# Patient Record
Sex: FEMALE
[De-identification: names, ages, dates, MRNs, and addresses within clinical notes are randomized per-mention and may not be internally consistent; named-entity substitution may affect disease eponyms.]

---

## 2017-11-15 PROBLEM — Z00.00 ENCOUNTER FOR PREVENTIVE HEALTH EXAMINATION: Status: ACTIVE | Noted: 2017-11-15

## 2017-11-21 ENCOUNTER — APPOINTMENT (OUTPATIENT)
Dept: PULMONOLOGY | Facility: CLINIC | Age: 32
End: 2017-11-21
Payer: COMMERCIAL

## 2017-11-21 PROCEDURE — 99203 OFFICE O/P NEW LOW 30 MIN: CPT | Mod: 25

## 2017-11-21 PROCEDURE — 36415 COLL VENOUS BLD VENIPUNCTURE: CPT

## 2017-11-21 PROCEDURE — 93000 ELECTROCARDIOGRAM COMPLETE: CPT

## 2017-11-30 NOTE — ASU PATIENT PROFILE, ADULT - PMH
Asthma  mild persistent  CRI (chronic renal insufficiency), stage 2 (mild) Childhood asthma    Endometriosis    Iron deficiency anemia    Vitamin D deficiency

## 2017-11-30 NOTE — ASU PATIENT PROFILE, ADULT - VISION (WITH CORRECTIVE LENSES IF THE PATIENT USUALLY WEARS THEM):
wear contact lenses/Partially impaired: cannot see medication labels or newsprint, but can see obstacles in path, and the surrounding layout; can count fingers at arm's length

## 2017-12-01 ENCOUNTER — RESULT REVIEW (OUTPATIENT)
Age: 32
End: 2017-12-01

## 2017-12-01 ENCOUNTER — INPATIENT (INPATIENT)
Facility: HOSPITAL | Age: 32
LOS: 0 days | Discharge: HOME | DRG: 743 | End: 2017-12-02
Attending: OBSTETRICS & GYNECOLOGY | Admitting: OBSTETRICS & GYNECOLOGY
Payer: COMMERCIAL

## 2017-12-01 VITALS
HEIGHT: 62 IN | WEIGHT: 181 LBS | RESPIRATION RATE: 18 BRPM | OXYGEN SATURATION: 99 % | HEART RATE: 67 BPM | TEMPERATURE: 98 F | SYSTOLIC BLOOD PRESSURE: 135 MMHG | DIASTOLIC BLOOD PRESSURE: 64 MMHG

## 2017-12-01 DIAGNOSIS — Z98.890 OTHER SPECIFIED POSTPROCEDURAL STATES: Chronic | ICD-10-CM

## 2017-12-01 RX ORDER — OXYCODONE AND ACETAMINOPHEN 5; 325 MG/1; MG/1
1 TABLET ORAL EVERY 4 HOURS
Qty: 0 | Refills: 0 | Status: DISCONTINUED | OUTPATIENT
Start: 2017-12-01 | End: 2017-12-02

## 2017-12-01 RX ORDER — KETOROLAC TROMETHAMINE 30 MG/ML
30 SYRINGE (ML) INJECTION EVERY 6 HOURS
Qty: 0 | Refills: 0 | Status: DISCONTINUED | OUTPATIENT
Start: 2017-12-01 | End: 2017-12-02

## 2017-12-01 RX ORDER — SODIUM CHLORIDE 9 MG/ML
1000 INJECTION, SOLUTION INTRAVENOUS
Qty: 0 | Refills: 0 | Status: DISCONTINUED | OUTPATIENT
Start: 2017-12-01 | End: 2017-12-02

## 2017-12-01 RX ORDER — ONDANSETRON 8 MG/1
4 TABLET, FILM COATED ORAL ONCE
Qty: 0 | Refills: 0 | Status: COMPLETED | OUTPATIENT
Start: 2017-12-01 | End: 2017-12-01

## 2017-12-01 RX ORDER — ACETAMINOPHEN 500 MG
1000 TABLET ORAL ONCE
Qty: 0 | Refills: 0 | Status: COMPLETED | OUTPATIENT
Start: 2017-12-01 | End: 2017-12-01

## 2017-12-01 RX ORDER — SIMETHICONE 80 MG/1
80 TABLET, CHEWABLE ORAL EVERY 8 HOURS
Qty: 0 | Refills: 0 | Status: DISCONTINUED | OUTPATIENT
Start: 2017-12-01 | End: 2017-12-02

## 2017-12-01 RX ORDER — OXYCODONE AND ACETAMINOPHEN 5; 325 MG/1; MG/1
2 TABLET ORAL EVERY 6 HOURS
Qty: 0 | Refills: 0 | Status: DISCONTINUED | OUTPATIENT
Start: 2017-12-01 | End: 2017-12-02

## 2017-12-01 RX ORDER — MORPHINE SULFATE 50 MG/1
4 CAPSULE, EXTENDED RELEASE ORAL
Qty: 0 | Refills: 0 | Status: DISCONTINUED | OUTPATIENT
Start: 2017-12-01 | End: 2017-12-01

## 2017-12-01 RX ORDER — METOCLOPRAMIDE HCL 10 MG
10 TABLET ORAL EVERY 6 HOURS
Qty: 0 | Refills: 0 | Status: DISCONTINUED | OUTPATIENT
Start: 2017-12-01 | End: 2017-12-02

## 2017-12-01 RX ADMIN — MORPHINE SULFATE 4 MILLIGRAM(S): 50 CAPSULE, EXTENDED RELEASE ORAL at 11:28

## 2017-12-01 RX ADMIN — SIMETHICONE 80 MILLIGRAM(S): 80 TABLET, CHEWABLE ORAL at 21:06

## 2017-12-01 RX ADMIN — MORPHINE SULFATE 4 MILLIGRAM(S): 50 CAPSULE, EXTENDED RELEASE ORAL at 10:47

## 2017-12-01 RX ADMIN — MORPHINE SULFATE 4 MILLIGRAM(S): 50 CAPSULE, EXTENDED RELEASE ORAL at 11:07

## 2017-12-01 RX ADMIN — MORPHINE SULFATE 4 MILLIGRAM(S): 50 CAPSULE, EXTENDED RELEASE ORAL at 11:05

## 2017-12-01 RX ADMIN — OXYCODONE AND ACETAMINOPHEN 2 TABLET(S): 5; 325 TABLET ORAL at 17:44

## 2017-12-01 RX ADMIN — MORPHINE SULFATE 4 MILLIGRAM(S): 50 CAPSULE, EXTENDED RELEASE ORAL at 11:24

## 2017-12-01 RX ADMIN — SIMETHICONE 80 MILLIGRAM(S): 80 TABLET, CHEWABLE ORAL at 15:46

## 2017-12-01 RX ADMIN — Medication 30 MILLIGRAM(S): at 15:46

## 2017-12-01 RX ADMIN — MORPHINE SULFATE 4 MILLIGRAM(S): 50 CAPSULE, EXTENDED RELEASE ORAL at 11:43

## 2017-12-01 RX ADMIN — ONDANSETRON 4 MILLIGRAM(S): 8 TABLET, FILM COATED ORAL at 17:43

## 2017-12-01 RX ADMIN — Medication 30 MILLIGRAM(S): at 21:06

## 2017-12-01 RX ADMIN — Medication 1000 MILLIGRAM(S): at 14:30

## 2017-12-01 RX ADMIN — Medication 400 MILLIGRAM(S): at 14:14

## 2017-12-01 NOTE — H&P ADULT - ASSESSMENT
31yo w/ h/o endometriosis s/p l/s excision in 2013 presents for repeat l/s endo excision  -ivf  -npo  -plan for admission postoperativley

## 2017-12-01 NOTE — PROGRESS NOTE ADULT - ASSESSMENT
32y Female POD# 0 s/p l/s endometriosis excision                                 1. Neuro/Pain:  toradol ATC, percocet PRN  2  CV:   VS per routine  3. Pulm: Encourage ISS  4. GI: clears advance as tolerated  5. :  corine garcias in AM  6. Heme: Stable, AM CBC  7. ID: --  8. DVT ppx: SCDs  9. Dispo: Likely POD#1

## 2017-12-01 NOTE — ASU PREOP CHECKLIST - SELECT TESTS ORDERED
HCG/CBC/Urinalysis/BMP/PT/PTT HCG/urine  hcg-/CBC/Urinalysis/BMP/PT/PTT BMP/Urinalysis/HCG/CBC/PT/PTT/urine  hcg-neg

## 2017-12-01 NOTE — PROGRESS NOTE ADULT - SUBJECTIVE AND OBJECTIVE BOX
Pt evaluated at bedside.  She reports pain is moderately controlled. She has not yet passed flatus, she is tolerating ice chips.. deneis heavy vaginal bleeding.    T(C): 36.9 (12-01-17 @ 10:50), Max: 36.9 (12-01-17 @ 06:44)  HR: 64 (12-01-17 @ 14:07) (58 - 98)  BP: 103/60 (12-01-17 @ 14:07) (99/60 - 135/64)  RR: 20 (12-01-17 @ 14:07) (12 - 24)  SpO2: 95% (12-01-17 @ 14:07) (95% - 100%)  GA: NAD  Pulm: Nonlabored breathing  Abd: soft, appropriatley tender, nondistended, no rebound or guarding  Extrem: SCDs in place    12-01 @ 07:01  -  12-01 @ 14:21  --------------------------------------------------------  IN: 875 mL / OUT: 710 mL / NET: 165 mL

## 2017-12-01 NOTE — H&P ADULT - HISTORY OF PRESENT ILLNESS
33yo P0 w/ h/o endometriosis s/p l/s resection 2013 presents for repeat l/s resection. Patient notes severe b/l pelvic pain with periods and inbetween periods. She notes bloating, diffuse lower back pain and right leg pain, and dysperunia she denies diarrhea, constipation, urinary symptoms.    GynHx: LMP 11/20/17, endometriosis s/p excision 2013  PMH: asthma, endometriosis  PSH: l/s endo excision 2013  NKDA    Vital Signs Last 24 Hrs  T(C): 36.9 (01 Dec 2017 06:44), Max: 36.9 (01 Dec 2017 06:44)  T(F): 98.4 (01 Dec 2017 06:44), Max: 98.4 (01 Dec 2017 06:44)  HR: 67 (01 Dec 2017 06:44) (67 - 67)  BP: 135/64 (01 Dec 2017 06:44) (135/64 - 135/64)  BP(mean): --  RR: 18 (01 Dec 2017 06:44) (18 - 18)  SpO2: 99% (01 Dec 2017 06:44) (99% - 99%)  GEN: NAD  Pulm: Nonlabored breathing  Abd: nongravid abdomen

## 2017-12-01 NOTE — H&P ADULT - NSHPPHYSICALEXAM_GEN_ALL_CORE
Vital Signs Last 24 Hrs  T(C): 36.9 (01 Dec 2017 06:44), Max: 36.9 (01 Dec 2017 06:44)  T(F): 98.4 (01 Dec 2017 06:44), Max: 98.4 (01 Dec 2017 06:44)  HR: 67 (01 Dec 2017 06:44) (67 - 67)  BP: 135/64 (01 Dec 2017 06:44) (135/64 - 135/64)  BP(mean): --  RR: 18 (01 Dec 2017 06:44) (18 - 18)  SpO2: 99% (01 Dec 2017 06:44) (99% - 99%)  GEN: NAD  Pulm: Nonlabored breathing  Abd: nongravid abdomen

## 2017-12-02 ENCOUNTER — TRANSCRIPTION ENCOUNTER (OUTPATIENT)
Age: 32
End: 2017-12-02

## 2017-12-02 VITALS
OXYGEN SATURATION: 99 % | DIASTOLIC BLOOD PRESSURE: 61 MMHG | TEMPERATURE: 99 F | SYSTOLIC BLOOD PRESSURE: 98 MMHG | HEART RATE: 54 BPM | RESPIRATION RATE: 15 BRPM

## 2017-12-02 LAB
BASOPHILS NFR BLD AUTO: 0.1 % — SIGNIFICANT CHANGE UP (ref 0–2)
EOSINOPHIL NFR BLD AUTO: 0.4 % — SIGNIFICANT CHANGE UP (ref 0–6)
HCT VFR BLD CALC: 32.6 % — LOW (ref 34.5–45)
HGB BLD-MCNC: 11 G/DL — LOW (ref 11.5–15.5)
LYMPHOCYTES # BLD AUTO: 26.3 % — SIGNIFICANT CHANGE UP (ref 13–44)
MCHC RBC-ENTMCNC: 30.4 PG — SIGNIFICANT CHANGE UP (ref 27–34)
MCHC RBC-ENTMCNC: 33.7 G/DL — SIGNIFICANT CHANGE UP (ref 32–36)
MCV RBC AUTO: 90.1 FL — SIGNIFICANT CHANGE UP (ref 80–100)
MONOCYTES NFR BLD AUTO: 8.1 % — SIGNIFICANT CHANGE UP (ref 2–14)
NEUTROPHILS NFR BLD AUTO: 65.1 % — SIGNIFICANT CHANGE UP (ref 43–77)
PLATELET # BLD AUTO: 161 K/UL — SIGNIFICANT CHANGE UP (ref 150–400)
RBC # BLD: 3.62 M/UL — LOW (ref 3.8–5.2)
RBC # FLD: 12.9 % — SIGNIFICANT CHANGE UP (ref 10.3–16.9)
WBC # BLD: 7.3 K/UL — SIGNIFICANT CHANGE UP (ref 3.8–10.5)
WBC # FLD AUTO: 7.3 K/UL — SIGNIFICANT CHANGE UP (ref 3.8–10.5)

## 2017-12-02 RX ORDER — SIMETHICONE 80 MG/1
1 TABLET, CHEWABLE ORAL
Qty: 0 | Refills: 0 | COMMUNITY
Start: 2017-12-02

## 2017-12-02 RX ADMIN — SODIUM CHLORIDE 125 MILLILITER(S): 9 INJECTION, SOLUTION INTRAVENOUS at 02:40

## 2017-12-02 RX ADMIN — SIMETHICONE 80 MILLIGRAM(S): 80 TABLET, CHEWABLE ORAL at 07:13

## 2017-12-02 RX ADMIN — Medication 30 MILLIGRAM(S): at 09:37

## 2017-12-02 RX ADMIN — SIMETHICONE 80 MILLIGRAM(S): 80 TABLET, CHEWABLE ORAL at 13:08

## 2017-12-02 RX ADMIN — OXYCODONE AND ACETAMINOPHEN 2 TABLET(S): 5; 325 TABLET ORAL at 14:30

## 2017-12-02 RX ADMIN — Medication 30 MILLIGRAM(S): at 10:10

## 2017-12-02 RX ADMIN — Medication 30 MILLIGRAM(S): at 04:37

## 2017-12-02 NOTE — DISCHARGE NOTE ADULT - HOSPITAL COURSE
Pt was admitted for laparoscopic endometriosis excision. She underwent temporary ovarian suspension requiring admit overnight for suspension. She did well, tolerated diet, urinated passed voiding trial.

## 2017-12-02 NOTE — PROGRESS NOTE ADULT - ATTENDING COMMENTS
Pt doing well passed voiding trial, tolerated breakfast. Abdomen soft. Dressing removed. Ok to go home today.

## 2017-12-02 NOTE — DISCHARGE NOTE ADULT - PLAN OF CARE
healing Follow up in office next week. Drink hot tea, shower, walk and light activity ok. Call or text me if you have severe pain/bleeding or fever >100.4.

## 2017-12-02 NOTE — DISCHARGE NOTE ADULT - CARE PLAN
Principal Discharge DX:	Endometriosis  Goal:	healing  Instructions for follow-up, activity and diet:	Follow up in office next week. Drink hot tea, shower, walk and light activity ok. Call or text me if you have severe pain/bleeding or fever >100.4.

## 2017-12-02 NOTE — DISCHARGE NOTE ADULT - PATIENT PORTAL LINK FT
“You can access the [facility] Patient Portal, offered by [facility], by registering with the following website: [email or web address]”

## 2017-12-02 NOTE — DISCHARGE NOTE ADULT - CARE PROVIDER_API CALL
Airam Parker (DO), Obstetrics and Gynecology  [address] [place] [address]  Phone: [phone]  Fax: [phone]

## 2017-12-02 NOTE — PROGRESS NOTE ADULT - SUBJECTIVE AND OBJECTIVE BOX
GYN Progress Note    Patient seen at bedside.  EAMON overnight.  She reports that her pain was well controlled overall.  Was able to get some sleep.  Tolerating clears, passing flatus.  Has not yet been out of bed.  Diego in situ.    Denies CP, palpitations, SOB, fever, chills, nausea, vomiting.    Vital Signs Last 24 Hrs  T(C): 36.9 (02 Dec 2017 05:09), Max: 36.9 (01 Dec 2017 10:50)  T(F): 98.5 (02 Dec 2017 05:09), Max: 98.5 (02 Dec 2017 05:09)  HR: 62 (02 Dec 2017 05:09) (54 - 98)  BP: 101/63 (02 Dec 2017 05:09) (94/53 - 122/66)  BP(mean): 78 (01 Dec 2017 13:06) (68 - 90)  RR: 16 (02 Dec 2017 05:09) (12 - 24)  SpO2: 99% (02 Dec 2017 05:09) (95% - 100%)    Physical Exam:  Gen: No Acute Distress  Pulm: Clear to auscultation bilaterally  GI: soft, nontender, mildly distended, +BS, no rebound, no guarding.  Incision C/D/I.  Ovarian suspensions removed without difficulty.  Ext: SCDs in place, [facility]    I&O's Summary    01 Dec 2017 07:01  -  02 Dec 2017 07:00  --------------------------------------------------------  IN: 875 mL / OUT: 3560 mL / NET: -2685 mL      MEDICATIONS  (STANDING):  ketorolac   Injectable 30 milliGRAM(s) IV Push every 6 hours  lactated ringers. 1000 milliLiter(s) (125 mL/Hr) IV Continuous <Continuous>  simethicone 80 milliGRAM(s) Chew every 8 hours    MEDICATIONS  (PRN):  metoclopramide Injectable 10 milliGRAM(s) IV Push every 6 hours PRN Nausea and/or Vomiting  oxyCODONE    5 mG/acetaminophen 325 mG 1 Tablet(s) Oral every 4 hours PRN Moderate Pain (4 - 6)  oxyCODONE    5 mG/acetaminophen 325 mG 2 Tablet(s) Oral every 6 hours PRN Severe Pain (7 - 10)      LABS:

## 2017-12-02 NOTE — DISCHARGE NOTE ADULT - MEDICATION SUMMARY - MEDICATIONS TO TAKE
I will START or STAY ON the medications listed below when I get home from the hospital:    oxyCODONE-acetaminophen 5 mg-325 mg oral tablet  -- 1 tab(s) by mouth every 4 hours, As needed, Moderate Pain (4 - 6)  -- Indication: For pain    simethicone 80 mg oral tablet, chewable  -- 1 tab(s) by mouth every 8 hours  -- Indication: For gas pain

## 2017-12-04 PROCEDURE — 36415 COLL VENOUS BLD VENIPUNCTURE: CPT

## 2017-12-04 PROCEDURE — 86900 BLOOD TYPING SEROLOGIC ABO: CPT

## 2017-12-04 PROCEDURE — 86850 RBC ANTIBODY SCREEN: CPT

## 2017-12-04 PROCEDURE — 88305 TISSUE EXAM BY PATHOLOGIST: CPT

## 2017-12-04 PROCEDURE — 86901 BLOOD TYPING SEROLOGIC RH(D): CPT

## 2017-12-04 PROCEDURE — 85025 COMPLETE CBC W/AUTO DIFF WBC: CPT

## 2017-12-06 LAB — SURGICAL PATHOLOGY STUDY: SIGNIFICANT CHANGE UP

## 2017-12-08 DIAGNOSIS — N80.3 ENDOMETRIOSIS OF PELVIC PERITONEUM: ICD-10-CM

## 2017-12-08 DIAGNOSIS — N80.4 ENDOMETRIOSIS OF RECTOVAGINAL SEPTUM AND VAGINA: ICD-10-CM

## 2017-12-08 DIAGNOSIS — D25.9 LEIOMYOMA OF UTERUS, UNSPECIFIED: ICD-10-CM

## 2017-12-08 DIAGNOSIS — Q51.3 BICORNATE UTERUS: ICD-10-CM

## 2017-12-08 DIAGNOSIS — N80.0 ENDOMETRIOSIS OF UTERUS: ICD-10-CM

## 2020-12-01 NOTE — DISCHARGE NOTE ADULT - ADDITIONAL INSTRUCTIONS
[facility]    PATIENT'S NAME: Muriel Adames PHYSICIAN: Margaret River MD   PATIENT ACCOUNT#:   [id number]    LOCATION:  [place] [address] RECORD #:   [id number]       YOB: 1958  ADMISSION DATE:       05/24/20 INSTRUCTIONS:  Follow up on discharge with infectious Disease in 1 week and will also follow up with me in my office in 1 to 2 weeks.     Dictated By Brook Catherine MD  d: 12/01/2020 09:20:10  t: 12/01/2020 10:09:46  [facility] [id number]  MV/ 10 days in office

## 2021-08-09 NOTE — H&P ADULT - NSHPPOADEEPVENOUSTHROMB_GEN_A_CORE
no How Severe Is Your Rash?: moderate Is This A New Presentation, Or A Follow-Up?: Rash Sarecycline Counseling: Patient advised regarding possible photosensitivity and discoloration of the teeth, skin, lips, tongue and gums.  Patient instructed to avoid sunlight, if possible.  When exposed to sunlight, patients should wear protective clothing, sunglasses, and sunscreen.  The patient was instructed to call the office immediately if the following severe adverse effects occur:  hearing changes, easy bruising/bleeding, severe headache, or vision changes.  The patient verbalized understanding of the proper use and possible adverse effects of sarecycline.  All of the patient's questions and concerns were addressed.